# Patient Record
Sex: FEMALE | Race: WHITE | NOT HISPANIC OR LATINO | Employment: UNEMPLOYED | URBAN - METROPOLITAN AREA
[De-identification: names, ages, dates, MRNs, and addresses within clinical notes are randomized per-mention and may not be internally consistent; named-entity substitution may affect disease eponyms.]

---

## 2017-09-20 ENCOUNTER — TELEPHONE (OUTPATIENT)
Dept: DERMATOLOGY | Facility: CLINIC | Age: 47
End: 2017-09-20

## 2017-09-20 NOTE — TELEPHONE ENCOUNTER
Spoke to patient.Refill over 1 year-Aldactone, patient will need to set up an appt to f/u on Acne.pt defer will fine derm in [facility].

## 2017-09-20 NOTE — TELEPHONE ENCOUNTER
----- Message from Elsi Retana sent at 9/20/2017  2:24 PM CDT -----  Call patient about getting refill on script. Call

## 2018-03-19 DIAGNOSIS — M75.51 BURSITIS OF RIGHT SHOULDER: Primary | ICD-10-CM

## 2018-03-19 DIAGNOSIS — M47.26 OTHER SPONDYLOSIS WITH RADICULOPATHY, LUMBAR REGION: Primary | ICD-10-CM

## 2018-03-19 DIAGNOSIS — G25.89 SCAPULAR DYSKINESIS: ICD-10-CM

## 2024-01-22 ENCOUNTER — OFFICE VISIT (OUTPATIENT)
Dept: URGENT CARE | Facility: CLINIC | Age: 54
End: 2024-01-22
Payer: MEDICARE

## 2024-01-22 VITALS
DIASTOLIC BLOOD PRESSURE: 74 MMHG | BODY MASS INDEX: 22.87 KG/M2 | HEIGHT: 57 IN | TEMPERATURE: 99 F | OXYGEN SATURATION: 97 % | WEIGHT: 106 LBS | HEART RATE: 84 BPM | SYSTOLIC BLOOD PRESSURE: 109 MMHG

## 2024-01-22 DIAGNOSIS — R05.9 COUGH, UNSPECIFIED TYPE: ICD-10-CM

## 2024-01-22 DIAGNOSIS — J02.9 SORE THROAT: ICD-10-CM

## 2024-01-22 DIAGNOSIS — U07.1 COVID-19: Primary | ICD-10-CM

## 2024-01-22 LAB
CTP QC/QA: YES
MOLECULAR STREP A: NEGATIVE
POC MOLECULAR INFLUENZA A AGN: NEGATIVE
POC MOLECULAR INFLUENZA B AGN: NEGATIVE
SARS-COV-2 AG RESP QL IA.RAPID: POSITIVE

## 2024-01-22 PROCEDURE — 87651 STREP A DNA AMP PROBE: CPT | Mod: QW,S$GLB,, | Performed by: PHYSICIAN ASSISTANT

## 2024-01-22 PROCEDURE — 99204 OFFICE O/P NEW MOD 45 MIN: CPT | Mod: S$GLB,,, | Performed by: PHYSICIAN ASSISTANT

## 2024-01-22 PROCEDURE — 87502 INFLUENZA DNA AMP PROBE: CPT | Mod: QW,S$GLB,, | Performed by: PHYSICIAN ASSISTANT

## 2024-01-22 PROCEDURE — 87811 SARS-COV-2 COVID19 W/OPTIC: CPT | Mod: QW,S$GLB,, | Performed by: PHYSICIAN ASSISTANT

## 2024-01-22 RX ORDER — BENZONATATE 100 MG/1
100 CAPSULE ORAL 3 TIMES DAILY PRN
Qty: 30 CAPSULE | Refills: 0 | Status: SHIPPED | OUTPATIENT
Start: 2024-01-22 | End: 2024-02-01

## 2024-01-22 RX ORDER — ALBUTEROL SULFATE 90 UG/1
2 AEROSOL, METERED RESPIRATORY (INHALATION) EVERY 6 HOURS PRN
Qty: 18 G | Refills: 0 | Status: SHIPPED | OUTPATIENT
Start: 2024-01-22 | End: 2024-06-13

## 2024-01-22 NOTE — PATIENT INSTRUCTIONS
Instructions for Patients with Confirmed or Suspected COVID-19    If you are awaiting your test result, you will either be called or it will be released to the patient portal.  If you have any questions about your test, please visit [email or web address] or call our COVID-19 information line at [phone].      Please isolate yourself at home.  You may leave home and/or return to work once the following conditions are met:    If you have symptoms and tested positive:  More than 5 days since symptoms first appeared AND  More than 24 hours fever free without medications AND       symptoms have improved   For five days after ending isolation, masks are required.    If you had no symptoms but tested positive:  More than 5 days since the date of the first positive test. If you develop symptoms, then use the guidelines above  For five days after ending isolation, masks are required.      Testing is not recommended if you are symptom free after completing isolation.

## 2024-01-22 NOTE — PROGRESS NOTES
"Subjective:      Patient ID: Keira Conti is a 53 y.o. female.    Vitals:  height is 4' 9" (1.448 m) and weight is 48.1 kg (106 lb). Her oral temperature is 98.6 °F (37 °C). Her blood pressure is 109/74 and her pulse is 84. Her oxygen saturation is 97%.     Chief Complaint: Sinus Problem    53 year old female patient presenting with sore throat, chills, congestion, ear pain, and headache x 2 days    Sinus Problem  This is a new problem. The current episode started in the past 7 days (2 days ago). The problem is unchanged. There has been no fever. Her pain is at a severity of 8/10. The pain is moderate. Associated symptoms include chills, congestion, ear pain, headaches, sinus pressure, sneezing and a sore throat. Pertinent negatives include no coughing, hoarse voice, neck pain or shortness of breath. Treatments tried: mucinex. The treatment provided mild relief.       Constitution: Positive for chills.   HENT:  Positive for ear pain, congestion, sinus pressure and sore throat.    Neck: Negative for neck pain.   Respiratory:  Negative for cough and shortness of breath.    Skin:  Negative for erythema.   Allergic/Immunologic: Positive for sneezing.   Neurological:  Positive for headaches.      Objective:     Physical Exam   Constitutional: She is oriented to person, place, and time. She appears well-developed. She is cooperative.  Non-toxic appearance. She does not appear ill. No distress.   HENT:   Head: Normocephalic and atraumatic.   Ears:   Right Ear: Hearing, tympanic membrane, external ear and ear canal normal.   Left Ear: Hearing, tympanic membrane, external ear and ear canal normal.   Nose: Nose normal. No mucosal edema, rhinorrhea, nasal deformity or congestion. No epistaxis. Right sinus exhibits no maxillary sinus tenderness and no frontal sinus tenderness. Left sinus exhibits no maxillary sinus tenderness and no frontal sinus tenderness.   Mouth/Throat: Uvula is midline and mucous membranes are normal. " No trismus in the jaw. Normal dentition. No uvula swelling. Posterior oropharyngeal erythema present. No oropharyngeal exudate or posterior oropharyngeal edema.   Eyes: Conjunctivae, EOM and lids are normal. Pupils are equal, round, and reactive to light. Right eye exhibits no discharge. Left eye exhibits no discharge. No scleral icterus.   Neck: Trachea normal and phonation normal. Neck supple. No JVD present. No tracheal deviation present. No thyromegaly present. No edema present. No erythema present. No neck rigidity present.   Cardiovascular: Normal rate, regular rhythm, normal heart sounds and normal pulses.   No murmur heard.Exam reveals no gallop and no friction rub.   Pulmonary/Chest: Effort normal and breath sounds normal. No stridor. No respiratory distress. She has no decreased breath sounds. She has no wheezes. She has no rhonchi. She has no rales. She exhibits no tenderness.   Abdominal: Normal appearance. She exhibits no distension. Soft. There is no abdominal tenderness. There is no rebound and no guarding.   Musculoskeletal: Normal range of motion.         General: No deformity. Normal range of motion.   Neurological: She is alert and oriented to person, place, and time. She exhibits normal muscle tone. Coordination normal.   Skin: Skin is warm, dry, intact, not diaphoretic, not pale and no rash. Capillary refill takes less than 2 seconds. No erythema   Psychiatric: Her speech is normal and behavior is normal. Judgment and thought content normal.   Nursing note and vitals reviewed.    Results for orders placed or performed in visit on 01/22/24   POCT Influenza A/B Molecular   Result Value Ref Range    POC Molecular Influenza A Ag Negative Negative, Not Reported    POC Molecular Influenza B Ag Negative Negative, Not Reported     Acceptable Yes    POCT Strep A, Molecular   Result Value Ref Range    Molecular Strep A, POC Negative Negative     Acceptable Yes    SARS  Coronavirus 2 Antigen, POCT Manual Read   Result Value Ref Range    SARS Coronavirus 2 Antigen Positive (A) Negative     Acceptable Yes     No results found.     Assessment:     1. COVID-19    2. Sore throat    3. Cough, unspecified type        Plan:       COVID-19  -     albuterol (VENTOLIN HFA) 90 mcg/actuation inhaler; Inhale 2 puffs into the lungs every 6 (six) hours as needed for Wheezing. Rescue  Dispense: 18 g; Refill: 0    Sore throat  -     POCT Influenza A/B Molecular  -     POCT Strep A, Molecular  -     SARS Coronavirus 2 Antigen, POCT Manual Read    Cough, unspecified type  -     albuterol (VENTOLIN HFA) 90 mcg/actuation inhaler; Inhale 2 puffs into the lungs every 6 (six) hours as needed for Wheezing. Rescue  Dispense: 18 g; Refill: 0  -     benzonatate (TESSALON) 100 MG capsule; Take 1 capsule (100 mg total) by mouth 3 (three) times daily as needed for Cough.  Dispense: 30 capsule; Refill: 0      Follow up if symptoms worsen or fail to improve, for F/U with PCP or ED.   Patient Instructions   Instructions for Patients with Confirmed or Suspected COVID-19    If you are awaiting your test result, you will either be called or it will be released to the patient portal.  If you have any questions about your test, please visit [email or web address] or call our COVID-19 information line at [phone].      Please isolate yourself at home.  You may leave home and/or return to work once the following conditions are met:    If you have symptoms and tested positive:  More than 5 days since symptoms first appeared AND  More than 24 hours fever free without medications AND       symptoms have improved   For five days after ending isolation, masks are required.    If you had no symptoms but tested positive:  More than 5 days since the date of the first positive test. If you develop symptoms, then use the guidelines above  For five days after ending isolation, masks are  required.      Testing is not recommended if you are symptom free after completing isolation.

## 2024-06-13 ENCOUNTER — OFFICE VISIT (OUTPATIENT)
Dept: FAMILY MEDICINE | Facility: CLINIC | Age: 54
End: 2024-06-13
Payer: MEDICARE

## 2024-06-13 DIAGNOSIS — B00.9 HERPES SIMPLEX INFECTION: Primary | ICD-10-CM

## 2024-06-13 PROCEDURE — 99214 OFFICE O/P EST MOD 30 MIN: CPT | Mod: 95,,, | Performed by: NURSE PRACTITIONER

## 2024-06-13 RX ORDER — VALACYCLOVIR HYDROCHLORIDE 1 G/1
1000 TABLET, FILM COATED ORAL 2 TIMES DAILY
Qty: 14 TABLET | Refills: 0 | Status: SHIPPED | OUTPATIENT
Start: 2024-06-13 | End: 2024-06-20

## 2024-06-13 NOTE — PROGRESS NOTES
Subjective:       Patient ID: Keira Conti is a 53 y.o. female.    Chief Complaint: Eye Problem    The patient location is: [place], [place]  The chief complaint leading to consultation is: blisters to face near right eye    Visit type: audiovisual    Face to Face time with patient: 23  25 minutes of total time spent on the encounter, which includes face to face time and non-face to face time preparing to see the patient (eg, review of tests), Obtaining and/or reviewing separately obtained history, Documenting clinical information in the electronic or other health record, Independently interpreting results (not separately reported) and communicating results to the patient/family/caregiver, or Care coordination (not separately reported).         Each patient to whom he or she provides medical services by telemedicine is:  (1) informed of the relationship between the physician and patient and the respective role of any other health care provider with respect to management of the patient; and (2) notified that he or she may decline to receive medical services by telemedicine and may withdraw from such care at any time.    Notes:      Rash to face near right eye  Patient reports she has recurrent fever blisters/herpes simplex under stressful situations  She has had previous breakouts to lips, nose, face  She reports she noted tiny blisters to right face just below the eyelid in the past week  NO eye involvement but under lower eyelid  See pictures below  Will treat with oral Acyclovir.      Eye Problem   The right eye is affected. This is a new problem. Episode onset: past 6 days. Associated symptoms include an eye discharge and weakness. Pertinent negatives include no vomiting.             There were no vitals filed for this visit.    Assessment:         ICD-10-CM ICD-9-CM   1. Herpes simplex infection  B00.9 054.9       Plan:       1. Herpes simplex infection  Patient reports she has recurrent fever blisters/herpes  simplex under stressful situations  She has had previous breakouts to lips, nose, face  She reports she noted tiny blisters to right face just below the eyelid in the past week  NO eye involvement but under lower eyelid  See pictures below  Will treat with oral Acyclovir.  -     valACYclovir (VALTREX) 1000 MG tablet; Take 1 tablet (1,000 mg total) by mouth 2 (two) times daily. for 7 days  Dispense: 14 tablet; Refill: 0       Follow up for see eye doctor immediately for any eye symptoms.     Patient's Medications   New Prescriptions    VALACYCLOVIR (VALTREX) 1000 MG TABLET    Take 1 tablet (1,000 mg total) by mouth 2 (two) times daily. for 7 days   Previous Medications    AMPHETAMINE SALT COMBO 15 MG TABLET    TK 1 T PO  BID   Modified Medications    No medications on file   Discontinued Medications    ALBUTEROL (VENTOLIN HFA) 90 MCG/ACTUATION INHALER    Inhale 2 puffs into the lungs every 6 (six) hours as needed for Wheezing. Rescue    ALPRAZOLAM (XANAX XR) 2 MG TB24    TK 1 T PO  BID    AMOXICILLIN-CLAVULANATE 875-125MG (AUGMENTIN) 875-125 MG PER TABLET    TK 1 T PO  BID TAT    BIMATOPROST (LATISSE) 0.03 % OPHTHALMIC SOLUTION    Place one drop on applicator and apply evenly along the skin of the upper eyelid at base of eyelashes once daily at bedtime    FLUOXETINE (PROZAC) 20 MG CAPSULE    TAKE ONE CAPSULE BY MOUTH DAILY    HYDROCODONE-ACETAMINOPHEN 10-325MG (NORCO)  MG TAB    TK 1 T PO BID FOR 30 DAYS    HYDROCODONE-ACETAMINOPHEN 7.5-325MG (NORCO) 7.5-325 MG PER TABLET    TK 1 T PO  Q 6 H PRN P    MELOXICAM (MOBIC) 7.5 MG TABLET        METHOCARBAMOL (ROBAXIN) 750 MG TAB        OXYCODONE-ACETAMINOPHEN (PERCOCET)  MG PER TABLET    TK 1 T PO  Q 6 H PRN P    SPIRONOLACTONE (ALDACTONE) 50 MG TABLET    Start with 1 po qd, increase to 2 po qd as tolerated         Review of Systems   Constitutional:  Negative for activity change and unexpected weight change.   HENT:  Negative for hearing loss, rhinorrhea and  trouble swallowing.    Eyes:  Positive for discharge and visual disturbance.   Respiratory:  Negative for chest tightness and wheezing.    Cardiovascular:  Negative for chest pain and palpitations.   Gastrointestinal:  Positive for constipation. Negative for blood in stool, diarrhea and vomiting.   Endocrine: Negative for polydipsia and polyuria.   Genitourinary:  Negative for difficulty urinating, dysuria, hematuria and menstrual problem.   Musculoskeletal:  Positive for arthralgias, joint swelling and neck pain.   Skin:  Positive for rash.   Neurological:  Positive for weakness. Negative for headaches.   Psychiatric/Behavioral:  Negative for confusion and dysphoric mood.          Objective:        Physical Exam  Eyes:        Comments: Tiny blisters below the right eye with redness and swelling             Past Medical History:   Diagnosis Date    Allergy 2016    Seasonal    Fever blister        Past Surgical History:   Procedure Laterality Date     SECTION  95,04    HYSTERECTOMY  2008    Partial    TUBAL LIGATION  2004       Family History   Problem Relation Name Age of Onset    Melanoma Father         Social History     Socioeconomic History    Marital status:    Occupational History    Occupation: Manger/   Tobacco Use    Smoking status: Every Day     Types: Vaping with nicotine   Substance and Sexual Activity    Alcohol use: No    Sexual activity: Not Currently   Other Topics Concern    Are you pregnant or think you may be? No    Breast-feeding No   Social History Narrative    ** Merged History Encounter **          Social Determinants of Health     Financial Resource Strain: High Risk (2024)    Overall Financial Resource Strain (CARDIA)     Difficulty of Paying Living Expenses: Very hard   Food Insecurity: Food Insecurity Present (2024)    Hunger Vital Sign     Worried About Running Out of Food in the Last Year: Often true     Ran Out of Food in the  Last Year: Sometimes true   Physical Activity: Patient Declined (6/12/2024)    Exercise Vital Sign     Days of Exercise per Week: Patient declined     Minutes of Exercise per Session: Patient declined   Stress: Stress Concern Present (6/12/2024)    [employer] - Occupational Stress Questionnaire     Feeling of Stress : Very much   Housing Stability: High Risk (6/12/2024)    Housing Stability Vital Sign     Unable to Pay for Housing in the Last Year: Yes

## 2025-08-04 ENCOUNTER — NURSE TRIAGE (OUTPATIENT)
Dept: ADMINISTRATIVE | Facility: CLINIC | Age: 55
End: 2025-08-04
Payer: MEDICARE

## 2025-08-04 NOTE — TELEPHONE ENCOUNTER
Pt reports right knee pain, swelling, and bruising that began 5 weeks ago and has worsened over the past week. Pt reports 10/10 with bending and touching knee and 0/10 pain at rest. Denies redness, fever. Care advice to be seen within 3 days in office. Appointment scheduled for pt. Pt VU.   Reason for Disposition   MILD or MODERATE swelling (e.g., can't move joint normally, can't do usual activities) (Exceptions: Itchy, localized swelling; swelling is chronic.)    Additional Information   Negative: Patient sounds very sick or weak to the triager   Negative: Knee pain and fever   Negative: Knee redness and fever   Negative: SEVERE pain (e.g., excruciating, unable to walk) and not improved after 2 hours of pain medicine   Negative: Redness and painful when touched and no fever   Negative: Red area or streak > 2 inches (or 5 cm)   Negative: Thigh or calf pain and only 1 side and present > 1 hour   Negative: Thigh or calf swelling and only 1 side   Negative: SEVERE swelling (e.g., can't move swollen knee at all)   Negative: Looks like a boil, infected sore, deep ulcer or other infected rash (spreading redness, pus)   Negative: Redness of the skin and no fever   Negative: Patient wants to be seen    Protocols used: Knee Swelling-A-OH

## 2025-08-05 ENCOUNTER — TELEPHONE (OUTPATIENT)
Dept: FAMILY MEDICINE | Facility: CLINIC | Age: 55
End: 2025-08-05

## 2025-08-05 ENCOUNTER — OFFICE VISIT (OUTPATIENT)
Dept: FAMILY MEDICINE | Facility: CLINIC | Age: 55
End: 2025-08-05
Payer: MEDICARE

## 2025-08-05 VITALS
DIASTOLIC BLOOD PRESSURE: 72 MMHG | HEART RATE: 86 BPM | BODY MASS INDEX: 22.47 KG/M2 | WEIGHT: 103.81 LBS | SYSTOLIC BLOOD PRESSURE: 106 MMHG | TEMPERATURE: 98 F | OXYGEN SATURATION: 99 %

## 2025-08-05 DIAGNOSIS — M25.561 ACUTE PAIN OF RIGHT KNEE: Primary | ICD-10-CM

## 2025-08-05 PROCEDURE — 99213 OFFICE O/P EST LOW 20 MIN: CPT | Mod: PBBFAC,PN

## 2025-08-05 PROCEDURE — 99214 OFFICE O/P EST MOD 30 MIN: CPT | Mod: S$PBB,,,

## 2025-08-05 PROCEDURE — 99999 PR PBB SHADOW E&M-EST. PATIENT-LVL III: CPT | Mod: PBBFAC,,,

## 2025-08-05 RX ORDER — DICLOFENAC SODIUM 10 MG/G
2 GEL TOPICAL 3 TIMES DAILY PRN
Qty: 100 G | Refills: 0 | Status: SHIPPED | OUTPATIENT
Start: 2025-08-05

## 2025-08-05 NOTE — PROGRESS NOTES
Subjective:      Keira Conti is a 54 y.o. female, patient of Lauren Huntley MD with a PMH listed below.       History of Present Illness    CHIEF COMPLAINT:  Keira presents today for right knee pain.    HISTORY OF PRESENT ILLNESS:  She reports right knee pain that started 5 weeks ago without specific injury. Pain is located in the front and outer aspect of the knee, with radiation down the leg. She describes the pain as burning and excruciating, particularly with knee flexion. Pain was exacerbated after pulling tree branches two weeks ago and after yard work last Wednesday. She reports no pain at rest but notes increased pain with certain positions. The knee is tender to touch. She has been managing symptoms with alternating ice and heat, reporting no significant relief from Motrin and Tylenol (takes minimally because she does not like taking medication). She denies difficulty walking or associated swelling.     MEDICAL HISTORY:  She has advanced osteoarthritis in lower back diagnosed in 2008, with pain exacerbated by activity. She also has advanced cervical spondylosis with associated nerve damage, experiencing radiating pain down leg and foot numbness.    SOCIAL HISTORY:  She was previously very active but reports decreased activity recently due to caring for mother in hospice until passing on May 28th. Current activity is limited to walking outside and playing with dogs. She denies formal exercise routine.    ALLERGIES:  She has known allergy to lidocaine, reporting skin reaction with exposure.         Past Medical History:   Diagnosis Date    Allergy 06/28/2016    Seasonal    Bipolar disorder, unspecified     Cervical spondylosis     Fever blister     Herpes simplex disease     Raynaud's syndrome without gangrene        Review of Systems   Constitutional:  Negative for chills and fever.   Respiratory:  Negative for cough and shortness of breath.    Cardiovascular:  Negative for chest pain.    Musculoskeletal:  Positive for arthralgias (Right knee pain). Negative for gait problem and myalgias.   Skin:  Negative for rash.   Neurological:  Negative for weakness and numbness.   Psychiatric/Behavioral:  Negative for confusion.           Objective:     Vitals:    08/05/25 1126   BP: 106/72   BP Location: Left arm   Patient Position: Sitting   Pulse: 86   Temp: 98.2 °F (36.8 °C)   TempSrc: Oral   SpO2: 99%   Weight: 47.1 kg (103 lb 13.4 oz)          Physical Exam  Vitals reviewed.   Constitutional:       General: She is awake. She is not in acute distress.     Appearance: She is not ill-appearing.   HENT:      Mouth/Throat:      Mouth: Mucous membranes are moist.   Cardiovascular:      Rate and Rhythm: Normal rate and regular rhythm.      Pulses:           Dorsalis pedis pulses are 2+ on the right side and 2+ on the left side.      Heart sounds: S1 normal and S2 normal.   Pulmonary:      Effort: Pulmonary effort is normal.      Breath sounds: Normal breath sounds.   Musculoskeletal:         General: Normal range of motion.      Right knee: No swelling, deformity, effusion, erythema, ecchymosis or bony tenderness. Normal range of motion. Tenderness present.      Left knee: Normal.      Right lower leg: No edema.      Left lower leg: No edema.        Legs:       Comments: Pain to site marked above.    Skin:     General: Skin is warm and dry.   Neurological:      General: No focal deficit present.      Mental Status: She is alert and oriented to person, place, and time.      Sensory: No sensory deficit.      Motor: No weakness.      Gait: Gait normal.   Psychiatric:         Mood and Affect: Mood normal.         Behavior: Behavior normal. Behavior is cooperative.         Thought Content: Thought content normal.         Judgment: Judgment normal.             Assessment:         ICD-10-CM ICD-9-CM   1. Acute pain of right knee  M25.561 719.46       Plan:       Acute pain of right knee  -     X-Ray Knee Complete 4  Or More Views Right; Future; Expected date: 08/05/2025  -     diclofenac sodium (VOLTAREN ARTHRITIS PAIN) 1 % Gel; Apply 2 g topically 3 (three) times daily as needed (knee pain).  Dispense: 100 g; Refill: 0  -Discussed conservative measures at this time- activity modification, voltaren gel PRN, OTC tylenol PRN.   -Follow up pending x ray results.     Assisted patient in [facility] with Dr. Dakwins on 8/14/2025.       RTC/ED precautions discussed where applicable.   Encouraged patient/caregiver to let me know if there are any further questions/concerns.   Patient/caretaker agrees with the treatment plan.     Follow up if symptoms worsen or fail to improve.      NUBIA Fang  Family Medicine  [facility]     This note was generated with the assistance of ambient listening technology. Verbal consent was obtained by the patient and accompanying visitor(s) for the recording of patient appointment to facilitate this note. I attest to having reviewed and edited the generated note for accuracy, though some syntax or spelling errors may persist. Please contact the author of this note for any clarification.

## 2025-08-05 NOTE — TELEPHONE ENCOUNTER
Spoke with pt and went over her results pt states she didn't have any questions or concerns at the moment pt verbally understood.

## 2025-08-05 NOTE — TELEPHONE ENCOUNTER
----- Message from Nisreen Fuentes NP sent at 8/5/2025  4:14 PM CDT -----  Please call the patient with the followings results: Your x rays are negative for broken bones, dislocations, or arthritis. At this time I recommended OTC antiinflammatories and activity   modifications as discussed at your visit. If your symptoms worsen or not improving with treatment then I would recommend returning to clinic for further evaluation or possible referral to   orthopedics.   ----- Message -----  From: Interface, Rad Results In  Sent: 8/5/2025  12:44 PM CDT  To: Nisreen Fuentes NP